# Patient Record
Sex: FEMALE | Race: WHITE | Employment: UNEMPLOYED | URBAN - METROPOLITAN AREA
[De-identification: names, ages, dates, MRNs, and addresses within clinical notes are randomized per-mention and may not be internally consistent; named-entity substitution may affect disease eponyms.]

---

## 2017-02-07 ENCOUNTER — OFFICE VISIT (OUTPATIENT)
Dept: FAMILY MEDICINE | Facility: CLINIC | Age: 27
End: 2017-02-07
Payer: COMMERCIAL

## 2017-02-07 VITALS
WEIGHT: 129 LBS | SYSTOLIC BLOOD PRESSURE: 116 MMHG | DIASTOLIC BLOOD PRESSURE: 72 MMHG | TEMPERATURE: 99.6 F | BODY MASS INDEX: 21.49 KG/M2 | HEIGHT: 65 IN | HEART RATE: 75 BPM | RESPIRATION RATE: 16 BRPM

## 2017-02-07 DIAGNOSIS — F33.1 MODERATE EPISODE OF RECURRENT MAJOR DEPRESSIVE DISORDER (H): Primary | ICD-10-CM

## 2017-02-07 DIAGNOSIS — F41.9 ANXIETY: ICD-10-CM

## 2017-02-07 PROCEDURE — 99214 OFFICE O/P EST MOD 30 MIN: CPT | Performed by: FAMILY MEDICINE

## 2017-02-07 RX ORDER — SERTRALINE HYDROCHLORIDE 25 MG/1
25 TABLET, FILM COATED ORAL DAILY
Qty: 30 TABLET | Refills: 11 | Status: SHIPPED | OUTPATIENT
Start: 2017-02-07 | End: 2018-02-15

## 2017-02-07 ASSESSMENT — PATIENT HEALTH QUESTIONNAIRE - PHQ9: 5. POOR APPETITE OR OVEREATING: NEARLY EVERY DAY

## 2017-02-07 ASSESSMENT — ANXIETY QUESTIONNAIRES
IF YOU CHECKED OFF ANY PROBLEMS ON THIS QUESTIONNAIRE, HOW DIFFICULT HAVE THESE PROBLEMS MADE IT FOR YOU TO DO YOUR WORK, TAKE CARE OF THINGS AT HOME, OR GET ALONG WITH OTHER PEOPLE: VERY DIFFICULT
5. BEING SO RESTLESS THAT IT IS HARD TO SIT STILL: NEARLY EVERY DAY
2. NOT BEING ABLE TO STOP OR CONTROL WORRYING: NEARLY EVERY DAY
3. WORRYING TOO MUCH ABOUT DIFFERENT THINGS: NEARLY EVERY DAY
1. FEELING NERVOUS, ANXIOUS, OR ON EDGE: NEARLY EVERY DAY
GAD7 TOTAL SCORE: 18
6. BECOMING EASILY ANNOYED OR IRRITABLE: NEARLY EVERY DAY
7. FEELING AFRAID AS IF SOMETHING AWFUL MIGHT HAPPEN: NOT AT ALL

## 2017-02-07 NOTE — MR AVS SNAPSHOT
After Visit Summary   2/7/2017    Deepti Kahn    MRN: [id number]           Patient Information     Date Of Birth          1990        Visit Information        Provider Department      2/7/2017 1:20 PM Andrey Linder MD [facility]        Today's Diagnoses     Moderate episode of recurrent major depressive disorder (H)    -  1     Anxiety           Care Instructions      Depression: Tips to Help Yourself  As your health care providers help treat your depression, you can also help yourself. Keep in mind that your illness affects you emotionally, physically, mentally, and socially. So full recovery will take time. Take care of your body and your soul, and be patient with yourself as you get better.    Be with others  Don t isolate yourself--you ll only feel worse. Try to be with other people. And take part in fun activities when you can. Go to a movie, [employer], Muslim service, or social event. Talk openly with people you can trust. And accept help when it s offered.  Keep your perspective    Depression can cloud your judgment. So wait until you feel better before making major life decisions, such as changing jobs, moving, or getting  or .    This illness is not your fault. Don t blame yourself for your depression.    Recovering from depression is a process. Don t be discouraged if it takes some time to feel better.    Depression saps your energy and concentration. So you won t be able to do all the things you used to do. Set small goals and do what you can.  Take care of your body  People with depression often lose the desire to take care of themselves. That only makes their problems worse. During treatment and afterward, make a point to:    Exercise. It s a great way to take care of your body. And studies have shown that exercise helps fight depression.    Avoid drugs and alcohol. These may ease the pain in the short term. But they ll only make your  problems worse in the long run.    Get relief from stress. Ask your healthcare provider for relaxation exercises and techniques to help relieve stress.    Eat right. A balanced and healthy diet helps keep your body healthy.    7619-5165 [employer]. [address] [place] [address], [place] [address]. All rights reserved. This information is not intended as a substitute for professional medical care. Always follow your healthcare professional's instructions.        Understanding Generalized Anxiety Disorder (RUSS)  Anxiety can fill you with worry and fear. Sometimes anxiety is healthy. It alerts you to a potential threat and prompts you to respond and take action. But, for some people, anxiety gets so bad it causes problems in daily life. If you find yourself in a constant state of anxiety, you may have an anxiety disorder called generalized anxiety disorder (RUSS). Speak with your doctor or mental health professional to learn more. He or she can help.     What is generalized anxiety disorder?  With RUSS, you might worry about money, your family and friends, work, or the world in general. You might not even be sure what you're anxious about. Whatever it is, though, you have an intense fear that the worst will happen. These feelings never really go away. This constant worry affects your quality of life and makes it hard to function. RUSS can cause physical symptoms, too.  What are common symptoms of generalized anxiety disorder?  People with RUSS often think they have a physical illness. The disorder can cause symptoms, such as:    Muscle tension, especially in the neck and shoulders.    Nausea and stomach problems.    Frequent headaches.    Feeling lightheaded.    Restlessness, trouble sleeping.    Feeling irritable and on edge all the time.  How can generalized anxiety disorder be treated?  RUSS can be treated with medicine or therapy, or both. Medicine helps to reduce symptoms, so you can continue with your daily  "routine. Therapy helps you understand the cause of your anxiety and learn how to manage it. Both forms of treatment help you deal with obstacles that anxiety causes in your life, so you can be healthier and happier.    0985-8123 [employer]. [address] [place] [address], [place] [address]. All rights reserved. This information is not intended as a substitute for professional medical care. Always follow your healthcare professional's instructions.              Follow-ups after your visit        Who to contact     If you have questions or need follow up information about today's clinic visit or your schedule please contact [facility] directly at [phone].  Normal or non-critical lab and imaging results will be communicated to you by MyChart, letter or phone within 4 business days after the clinic has received the results. If you do not hear from us within 7 days, please contact the clinic through [employer] or phone. If you have a critical or abnormal lab result, we will notify you by phone as soon as possible.  Submit refill requests through [employer] or call your pharmacy and they will forward the refill request to us. Please allow 3 business days for your refill to be completed.          Additional Information About Your Visit        [employer] Information     [employer] gives you secure access to your electronic health record. If you see a primary care provider, you can also send messages to your care team and make appointments. If you have questions, please call your primary care clinic.  If you do not have a primary care provider, please call [phone] and they will assist you.        Care EveryWhere ID     This is your Care EveryWhere ID. This could be used by other organizations to access your [facility] medical records  [id number]        Your Vitals Were     Pulse Temperature Respirations Height BMI (Body Mass Index) Last Period    75 99.6  F (37.6  C) (Tympanic) 16 5' 4.88\" (1.648 m) 21.54 " kg/m2 01/07/2017       Blood Pressure from Last 3 Encounters:   02/07/17 116/72   02/01/16 121/80   09/22/14 98/70    Weight from Last 3 Encounters:   02/07/17 129 lb (58.514 kg)   02/01/16 140 lb 12.8 oz (63.866 kg)   09/22/14 143 lb (64.864 kg)              Today, you had the following     No orders found for display         Today's Medication Changes          These changes are accurate as of: 2/7/17  1:55 PM.  If you have any questions, ask your nurse or doctor.               Start taking these medicines.        Dose/Directions    sertraline 25 MG tablet   Commonly known as:  ZOLOFT   Used for:  Moderate episode of recurrent major depressive disorder (H), Anxiety   Started by:  Andrey Linder MD        Dose:  25 mg   Take 1 tablet (25 mg) by mouth daily   Quantity:  30 tablet   Refills:  11            Where to get your medicines      These medications were sent to [facility] [address] [facility]  [address] [facility] [address]     Phone:  [phone]    - sertraline 25 MG tablet             Primary Care Provider Office Phone # Fax #    Rayna Walls [phone]       [facility] [address] [place] [address]        Thank you!     Thank you for choosing [facility]  for your care. Our goal is always to provide you with excellent care. Hearing back from our patients is one way we can continue to improve our services. Please take a few minutes to complete the written survey that you may receive in the mail after your visit with us. Thank you!             Your Updated Medication List - Protect others around you: Learn how to safely use, store and throw away your medicines at www.disposemymeds.org.          This list is accurate as of: 2/7/17  1:55 PM.  Always use your most recent med list.                   Brand Name Dispense Instructions for use    ibuprofen 200 MG capsule      Take 200 mg by mouth every 4 hours as  needed for fever.       norgestimate-ethinyl estradiol 0.25-35 MG-MCG per tablet    ORTHO-CYCLEN, SPRINTEC    84 tablet    Take 1 tablet by mouth daily Annual visit is overdue - please schedule for additional refills.       sertraline 25 MG tablet    ZOLOFT    30 tablet    Take 1 tablet (25 mg) by mouth daily

## 2017-02-07 NOTE — NURSING NOTE
"Chief Complaint   Patient presents with     Anxiety     Patient was on medication in 2009 for depression/anxiety.  States her symptoms today have become worse. Has not been on any Rx since then.       Initial /72 mmHg  Pulse 75  Temp(Src) 99.6  F (37.6  C) (Tympanic)  Resp 16  Ht 5' 4.88\" (1.648 m)  Wt 129 lb (58.514 kg)  BMI 21.54 kg/m2  LMP 01/07/2017 Estimated body mass index is 21.54 kg/(m^2) as calculated from the following:    Height as of this encounter: 5' 4.88\" (1.648 m).    Weight as of this encounter: 129 lb (58.514 kg).  Medication Reconciliation: complete  "

## 2017-02-07 NOTE — PATIENT INSTRUCTIONS
Depression: Tips to Help Yourself  As your health care providers help treat your depression, you can also help yourself. Keep in mind that your illness affects you emotionally, physically, mentally, and socially. So full recovery will take time. Take care of your body and your soul, and be patient with yourself as you get better.    Be with others  Don t isolate yourself--you ll only feel worse. Try to be with other people. And take part in fun activities when you can. Go to a movie, [employer], Rastafarian service, or social event. Talk openly with people you can trust. And accept help when it s offered.  Keep your perspective    Depression can cloud your judgment. So wait until you feel better before making major life decisions, such as changing jobs, moving, or getting  or .    This illness is not your fault. Don t blame yourself for your depression.    Recovering from depression is a process. Don t be discouraged if it takes some time to feel better.    Depression saps your energy and concentration. So you won t be able to do all the things you used to do. Set small goals and do what you can.  Take care of your body  People with depression often lose the desire to take care of themselves. That only makes their problems worse. During treatment and afterward, make a point to:    Exercise. It s a great way to take care of your body. And studies have shown that exercise helps fight depression.    Avoid drugs and alcohol. These may ease the pain in the short term. But they ll only make your problems worse in the long run.    Get relief from stress. Ask your healthcare provider for relaxation exercises and techniques to help relieve stress.    Eat right. A balanced and healthy diet helps keep your body healthy.    1238-6916 [employer]. [address] [place] [address], [place] [address]. All rights reserved. This information is not intended as a substitute for professional medical care. Always follow  your healthcare professional's instructions.        Understanding Generalized Anxiety Disorder (RUSS)  Anxiety can fill you with worry and fear. Sometimes anxiety is healthy. It alerts you to a potential threat and prompts you to respond and take action. But, for some people, anxiety gets so bad it causes problems in daily life. If you find yourself in a constant state of anxiety, you may have an anxiety disorder called generalized anxiety disorder (RUSS). Speak with your doctor or mental health professional to learn more. He or she can help.     What is generalized anxiety disorder?  With RUSS, you might worry about money, your family and friends, work, or the world in general. You might not even be sure what you're anxious about. Whatever it is, though, you have an intense fear that the worst will happen. These feelings never really go away. This constant worry affects your quality of life and makes it hard to function. RUSS can cause physical symptoms, too.  What are common symptoms of generalized anxiety disorder?  People with RUSS often think they have a physical illness. The disorder can cause symptoms, such as:    Muscle tension, especially in the neck and shoulders.    Nausea and stomach problems.    Frequent headaches.    Feeling lightheaded.    Restlessness, trouble sleeping.    Feeling irritable and on edge all the time.  How can generalized anxiety disorder be treated?  RUSS can be treated with medicine or therapy, or both. Medicine helps to reduce symptoms, so you can continue with your daily routine. Therapy helps you understand the cause of your anxiety and learn how to manage it. Both forms of treatment help you deal with obstacles that anxiety causes in your life, so you can be healthier and happier.    4569-1169 [employer]. [address] [place] [address], [place] [address]. All rights reserved. This information is not intended as a substitute for professional medical care. Always follow your  healthcare professional's instructions.

## 2017-02-07 NOTE — PROGRESS NOTES
SUBJECTIVE:                                                    Deepti Kahn is a 26 year old female who presents to clinic today for the following health issues:      Abnormal Mood Symptoms     Onset: depression more recently in past 1-2 months; anxiety has been more constant since 2009.     Description:   Depression: YES  Anxiety: YES    Accompanying Signs & Symptoms:  Still participating in activities that you used to enjoy: YES  Fatigue: YES, trouble falling asleep  Irritability: YES  Difficulty concentrating: YES  Changes in appetite: YES - consistently not hungry; when patient does eat, it's not healthy choices.  Problems with sleep: YES  Heart racing/beating fast : YES  Thoughts of hurting yourself or others: none     History:   Recent stress: YES, recent breakup with boyfriend; quit job (needs to find another job)  Prior depression hospitalization: None  Family history of depression: YES  Family history of anxiety: YES      Precipitating factors:   Alcohol/drug use: YES    Alleviating factors:  none       Therapies Tried and outcome: Patient did take prescription medicine in 2009 (unsure of what this was).      History as above. Patient is a 26 yr old female here for concerns of anxiety. She reports that she has struggled with this for many years but she had kind of coped with it. Lately she has had a lot going on, she broke off a relationship and she is looking for a job at present. Patient denies any suicidal ideation. She also reports that anxiety and depression runs in her family. She was on medication but this was a long time ago. She does not remember what she was on and she reports that she stopped it on her own because she did not want to take medication in general.     Problem list and histories reviewed & adjusted, as indicated.  Additional history: as documented    Patient Active Problem List   Diagnosis     Generalized anxiety disorder     CARDIOVASCULAR SCREENING; LDL GOAL LESS THAN 160      Oligomenorrhea     Papanicolaou smear of cervix with low grade squamous intraepithelial lesion (LGSIL)     Skin cancer screening     Multiple benign melanocytic nevi     Family history of melanoma     Multiple benign nevi     Past Surgical History   Procedure Laterality Date     Surgical history of -   2000     Adenoidectomy     Ent surgery       [place] teeth       extracted wisdom teeth       Social History   Substance Use Topics     Smoking status: Never Smoker      Smokeless tobacco: Never Used     Alcohol Use: Yes      Comment: once per week     Family History   Problem Relation Age of Onset     Hypertension Maternal Grandmother      Lipids Maternal Grandmother      Depression Maternal Grandmother      Anxiety Disorder Maternal Grandmother      Circulatory Paternal Grandmother      uses DESMOND hose     Anxiety Disorder Paternal Grandmother      Depression Paternal Grandmother      DIABETES Paternal Grandfather      Hypertension Paternal Grandfather      Lipids Paternal Grandfather      C.A.D. Paternal Grandfather      CANCER Mother      melanoma     Melanoma Mother      Skin Cancer No family hx of      Depression Father      Anxiety Disorder Father          Current Outpatient Prescriptions   Medication Sig Dispense Refill     sertraline (ZOLOFT) 25 MG tablet Take 1 tablet (25 mg) by mouth daily 30 tablet 11     norgestimate-ethinyl estradiol (ORTHO-CYCLEN, SPRINTEC) 0.25-35 MG-MCG per tablet Take 1 tablet by mouth daily Annual visit is overdue - please schedule for additional refills. 84 tablet 6     Ibuprofen 200 MG capsule Take 200 mg by mouth every 4 hours as needed for fever.       No Known Allergies    ROS:  C: NEGATIVE for fever, chills, change in weight  INTEGUMENTARY/SKIN: NEGATIVE for worrisome rashes, moles or lesions  ENT/MOUTH: NEGATIVE for ear, mouth and throat problems  R: NEGATIVE for significant cough or SOB  CV: NEGATIVE for chest pain, palpitations or peripheral edema  GI: NEGATIVE for nausea,  "abdominal pain, heartburn, or change in bowel habits  PSYCHIATRIC: POSITIVE foranxiety    OBJECTIVE:                                                    /72 mmHg  Pulse 75  Temp(Src) 99.6  F (37.6  C) (Tympanic)  Resp 16  Ht 5' 4.88\" (1.648 m)  Wt 129 lb (58.514 kg)  BMI 21.54 kg/m2  LMP 01/07/2017  Body mass index is 21.54 kg/(m^2).  GENERAL: healthy, alert and no distress  EYES: Eyes grossly normal to inspection, PERRL and conjunctivae and sclerae normal  HENT: ear canals and TM's normal, nose and mouth without ulcers or lesions  NECK: no adenopathy, no asymmetry, masses, or scars and thyroid normal to palpation  RESP: lungs clear to auscultation - no rales, rhonchi or wheezes  CV: regular rate and rhythm, normal S1 S2, no S3 or S4, no murmur, click or rub, no peripheral edema and peripheral pulses strong  ABDOMEN: soft, nontender, no hepatosplenomegaly, no masses and bowel sounds normal  MS: no gross musculoskeletal defects noted, no edema  PSYCH: mentation appears normal, affect flat, tearful, anxious, judgement and insight intact and appearance well groomed    Diagnostic Test Results:  none      ASSESSMENT/PLAN:                                                    1. Moderate episode of recurrent major depressive disorder (H)  Asked that she follow up in a couple of weeks to see how she is doing with the medication. Resources for counseling given to her.  - sertraline (ZOLOFT) 25 MG tablet; Take 1 tablet (25 mg) by mouth daily  Dispense: 30 tablet; Refill: 11    2. Anxiety  - sertraline (ZOLOFT) 25 MG tablet; Take 1 tablet (25 mg) by mouth daily  Dispense: 30 tablet; Refill: 11    FUTURE APPOINTMENTS:       - Follow-up visit as needed.  Patient Instructions     Depression: Tips to Help Yourself  As your health care providers help treat your depression, you can also help yourself. Keep in mind that your illness affects you emotionally, physically, mentally, and socially. So full recovery will take " time. Take care of your body and your soul, and be patient with yourself as you get better.    Be with others  Don t isolate yourself--you ll only feel worse. Try to be with other people. And take part in fun activities when you can. Go to a movie, ballgame, Restorationist service, or social event. Talk openly with people you can trust. And accept help when it s offered.  Keep your perspective    Depression can cloud your judgment. So wait until you feel better before making major life decisions, such as changing jobs, moving, or getting  or .    This illness is not your fault. Don t blame yourself for your depression.    Recovering from depression is a process. Don t be discouraged if it takes some time to feel better.    Depression saps your energy and concentration. So you won t be able to do all the things you used to do. Set small goals and do what you can.  Take care of your body  People with depression often lose the desire to take care of themselves. That only makes their problems worse. During treatment and afterward, make a point to:    Exercise. It s a great way to take care of your body. And studies have shown that exercise helps fight depression.    Avoid drugs and alcohol. These may ease the pain in the short term. But they ll only make your problems worse in the long run.    Get relief from stress. Ask your healthcare provider for relaxation exercises and techniques to help relieve stress.    Eat right. A balanced and healthy diet helps keep your body healthy.    1119-9699 [employer]. [address] [place] [address], [place] [address]. All rights reserved. This information is not intended as a substitute for professional medical care. Always follow your healthcare professional's instructions.        Understanding Generalized Anxiety Disorder (RUSS)  Anxiety can fill you with worry and fear. Sometimes anxiety is healthy. It alerts you to a potential threat and prompts you to respond and  take action. But, for some people, anxiety gets so bad it causes problems in daily life. If you find yourself in a constant state of anxiety, you may have an anxiety disorder called generalized anxiety disorder (RUSS). Speak with your doctor or mental health professional to learn more. He or she can help.     What is generalized anxiety disorder?  With RUSS, you might worry about money, your family and friends, work, or the world in general. You might not even be sure what you're anxious about. Whatever it is, though, you have an intense fear that the worst will happen. These feelings never really go away. This constant worry affects your quality of life and makes it hard to function. RUSS can cause physical symptoms, too.  What are common symptoms of generalized anxiety disorder?  People with RUSS often think they have a physical illness. The disorder can cause symptoms, such as:    Muscle tension, especially in the neck and shoulders.    Nausea and stomach problems.    Frequent headaches.    Feeling lightheaded.    Restlessness, trouble sleeping.    Feeling irritable and on edge all the time.  How can generalized anxiety disorder be treated?  RUSS can be treated with medicine or therapy, or both. Medicine helps to reduce symptoms, so you can continue with your daily routine. Therapy helps you understand the cause of your anxiety and learn how to manage it. Both forms of treatment help you deal with obstacles that anxiety causes in your life, so you can be healthier and happier.    7798-9312 [employer]. [address] [place] [address]. All rights reserved. This information is not intended as a substitute for professional medical care. Always follow your healthcare professional's instructions.              Andrey Linder MD  [facility]

## 2017-02-08 ASSESSMENT — ANXIETY QUESTIONNAIRES: GAD7 TOTAL SCORE: 18

## 2017-02-08 ASSESSMENT — PATIENT HEALTH QUESTIONNAIRE - PHQ9: SUM OF ALL RESPONSES TO PHQ QUESTIONS 1-9: 23

## 2017-03-15 ENCOUNTER — TELEPHONE (OUTPATIENT)
Dept: OBGYN | Facility: CLINIC | Age: 27
End: 2017-03-15

## 2017-03-15 DIAGNOSIS — Z01.419 ENCOUNTER FOR GYNECOLOGICAL EXAMINATION WITHOUT ABNORMAL FINDING: ICD-10-CM

## 2017-03-15 DIAGNOSIS — Z00.00 ROUTINE GENERAL MEDICAL EXAMINATION AT A HEALTH CARE FACILITY: ICD-10-CM

## 2017-03-15 DIAGNOSIS — Z23 NEED FOR TDAP VACCINATION: ICD-10-CM

## 2017-03-15 DIAGNOSIS — Z23 NEED FOR VACCINATION AGAINST HUMAN PAPILLOMAVIRUS: ICD-10-CM

## 2017-03-15 RX ORDER — NORGESTIMATE AND ETHINYL ESTRADIOL 0.25-0.035
1 KIT ORAL DAILY
Qty: 84 TABLET | Refills: 0 | Status: SHIPPED | OUTPATIENT
Start: 2017-03-15

## 2017-03-15 NOTE — TELEPHONE ENCOUNTER
Reason for Call:  Other     Detailed comments: Pt needs BCP rx updated according to the pharmacy - she is told this every 3 months. Would like to switch pharmacies to [facility] [address] [place]    Phone Number Patient can be reached at: Home number on file [phone] (home)    Best Time: After 2:30 today    Can we leave a detailed message on this number? YES    Call taken on 3/15/2017 at 11:44 AM by Denise Behrendt

## 2017-03-15 NOTE — TELEPHONE ENCOUNTER
Return call to patient.  Unable to reach.  Left message for patient to return call to clinic.    Patient due for annual visit, should schedule appointment.  Lynette refill x1 can be given, patient will need to be seen for any additional refills.    BP Readings from Last 2 Encounters:   02/07/17 116/72   02/01/16 121/80       Needs clarification on pharmacy - more than one [facility] pharmacy on [address] Karla Kearney   [facility]  RN

## 2017-03-15 NOTE — TELEPHONE ENCOUNTER
One refill sent to [facility] for pt upon call back. Pt stated she will make an annual visit appointment.   Gloria QUIJANO RN BSN PHN  Specialty Clinics

## 2017-08-07 ENCOUNTER — TELEPHONE (OUTPATIENT)
Dept: FAMILY MEDICINE | Facility: CLINIC | Age: 27
End: 2017-08-07

## 2017-08-07 NOTE — TELEPHONE ENCOUNTER
PHQ9 Due by:9/7/17    Please contact patient to complete follow up PHQ9 before their DUE DATE.     This is important feedback for your care team to monitor how you are doing while taking Zoloft.     You completed this same questionnaire   PHQ-9 SCORE 2/7/2017   Total Score -   Total Score 23       [facility] STAFF: If upon calling patient and PHQ9 score is higher that 10 route to the provider. You may also seek an RN for review.

## 2017-08-07 NOTE — LETTER
August 22, 2017      Deepti Kahn  [address] [address]        Dear Deepti,     We have been unable to reach you by phone Your [facility] Team works hard to make sure that you and your family receive exceptional care. Enclosed you will find a copy of the phq-9/gad7 depression form  that our clinic uses to monitor and manage your Depression/anxiety  This test is an assessment tool that we can use to determine how well you Depression  is controlled. Please fill out and mail back the enclosed  form. Enclosed is a stamped addressed envelope for you to mail the form  back to the clinic in.        Sincerely,        Andrey Linder MD

## 2017-08-22 NOTE — TELEPHONE ENCOUNTER
Panel Management Review      Patient has the following on her problem list:     Depression / Dysthymia review  PHQ-9 SCORE 10/14/2011 11/19/2012 2/7/2017   Total Score 18 0 -   Total Score - - 23      Patient is due for:  PHQ9 and DAP        Composite cancer screening  Chart review shows that this patient is due/due soon for the following None  Summary:    Patient is due/failing the following:   PHQ9    Action needed:   Patient needs to do PHQ9.    Type of outreach:    Sent letter. with phq=9 for patient to fill out and mail back    Questions for provider review:    None                                                                                                                                    Mario GONZALEZ CMA

## 2017-09-06 ENCOUNTER — TELEPHONE (OUTPATIENT)
Dept: FAMILY MEDICINE | Facility: CLINIC | Age: 27
End: 2017-09-06

## 2017-09-07 ENCOUNTER — TELEPHONE (OUTPATIENT)
Dept: FAMILY MEDICINE | Facility: CLINIC | Age: 27
End: 2017-09-07

## 2017-09-07 DIAGNOSIS — Z53.9 ERRONEOUS ENCOUNTER--DISREGARD: Primary | ICD-10-CM

## 2018-02-15 DIAGNOSIS — F41.9 ANXIETY: ICD-10-CM

## 2018-02-15 DIAGNOSIS — F33.1 MODERATE EPISODE OF RECURRENT MAJOR DEPRESSIVE DISORDER (H): ICD-10-CM

## 2018-02-20 RX ORDER — SERTRALINE HYDROCHLORIDE 25 MG/1
TABLET, FILM COATED ORAL
Qty: 60 TABLET | Refills: 0 | Status: SHIPPED | OUTPATIENT
Start: 2018-02-20 | End: 2018-04-03

## 2018-02-20 NOTE — TELEPHONE ENCOUNTER
Medication is being filled for 1 time refill only due to:  Patient needs to be seen because it has been more than one year since last visit.     Left message for patient that she is due for appointment prior to next fill.     Elaina Hardy RN

## 2018-05-17 DIAGNOSIS — F41.9 ANXIETY: ICD-10-CM

## 2018-05-17 DIAGNOSIS — F33.1 MODERATE EPISODE OF RECURRENT MAJOR DEPRESSIVE DISORDER (H): ICD-10-CM

## 2018-05-17 RX ORDER — SERTRALINE HYDROCHLORIDE 25 MG/1
TABLET, FILM COATED ORAL
Qty: 60 TABLET | Refills: 0 | Status: SHIPPED | OUTPATIENT
Start: 2018-05-17

## 2018-05-17 NOTE — TELEPHONE ENCOUNTER
**This refill requires provider completion and is not appropriate for RN review per RN refill guidelines.**  PH-Q9 needs to be less than 5 to approve medication on RN Refill protocol pt's score is 23.  Critsa Fields RN

## 2018-05-17 NOTE — TELEPHONE ENCOUNTER
"Requested Prescriptions   Pending Prescriptions Disp Refills     sertraline (ZOLOFT) 25 MG tablet [Pharmacy Med Name: SERTRALINE HCL 25 MG TABLET]  Last Written Prescription Date:  04/05/18  Last Fill Quantity: 60,  # refills: 0   Last office visit: 2/7/2017 with prescribing provider:  02/07/17   Future Office Visit:     60 tablet 0     Sig: TAKE 1 TABLET BY MOUTH DAILY MAY INCREASE TO 2 TABLETS AFER A WEEK    SSRIs Protocol Failed    5/17/2018  5:29 AM       Failed - PHQ-9 score less than 5 in past 6 months    Please review last PHQ-9 score.   PHQ-9 SCORE 10/14/2011 11/19/2012 2/7/2017   Total Score 18 0 -   Total Score - - 23          Failed - Recent (6 mo) or future (30 days) visit within the authorizing provider's specialty    Patient had office visit in the last 6 months or has a visit in the next 30 days with authorizing provider or within the authorizing provider's specialty.  See \"Patient Info\" tab in inbasket, or \"Choose Columns\" in Meds & Orders section of the refill encounter.           Passed - Patient is age 18 or older       Passed - No active pregnancy on record       Passed - No positive pregnancy test in last 12 months          "

## 2019-04-19 ENCOUNTER — HEALTH MAINTENANCE LETTER (OUTPATIENT)
Age: 29
End: 2019-04-19

## 2019-10-03 NOTE — TELEPHONE ENCOUNTER
"Requested Prescriptions   Pending Prescriptions Disp Refills     sertraline (ZOLOFT) 25 MG tablet [Pharmacy Med Name: SERTRALINE HCL 25 MG TABLET]  Last Written Prescription Date:  02/07/17  Last Fill Quantity: 30,  # refills: 11   Last office visit: 2/7/2017 with prescribing provider:  02/07/17   Future Office Visit:     60 tablet 4     Sig: TAKE 1 TABLET BY MOUTH DAILY MAY INCREASE TO 2 TABLETS AFER A WEEK    SSRIs Protocol Failed    2/15/2018  9:59 AM   RUSS-7 SCORE 10/14/2011 11/19/2012 2/7/2017   Total Score 21 0 -   Total Score - - 18       Failed - PHQ-9 score less than 5 in past 6 months    The PHQ-9 criteria is meant to fail. It requires a PHQ-9 score review  PHQ-9 SCORE 10/14/2011 11/19/2012 2/7/2017   Total Score 18 0 -   Total Score - - 23          Failed - Recent (6 mo) or future visit with authorizing provider's specialty    Patient had office visit in the last 6 months or has a visit in the next 30 days with authorizing provider.  See \"Patient Info\" tab in inbasket, or \"Choose Columns\" in Meds & Orders section of the refill encounter.           Passed - Patient is age 18 or older       Passed - No active pregnancy on record       Passed - No positive pregnancy test in last 12 months          " Yes

## 2019-11-03 ENCOUNTER — HEALTH MAINTENANCE LETTER (OUTPATIENT)
Age: 29
End: 2019-11-03

## 2020-11-16 ENCOUNTER — HEALTH MAINTENANCE LETTER (OUTPATIENT)
Age: 30
End: 2020-11-16

## 2021-09-18 ENCOUNTER — HEALTH MAINTENANCE LETTER (OUTPATIENT)
Age: 31
End: 2021-09-18

## 2022-01-02 ENCOUNTER — HEALTH MAINTENANCE LETTER (OUTPATIENT)
Age: 32
End: 2022-01-02

## 2022-11-19 ENCOUNTER — HEALTH MAINTENANCE LETTER (OUTPATIENT)
Age: 32
End: 2022-11-19

## 2023-04-09 ENCOUNTER — HEALTH MAINTENANCE LETTER (OUTPATIENT)
Age: 33
End: 2023-04-09